# Patient Record
Sex: FEMALE | Race: WHITE | NOT HISPANIC OR LATINO | Employment: FULL TIME | URBAN - METROPOLITAN AREA
[De-identification: names, ages, dates, MRNs, and addresses within clinical notes are randomized per-mention and may not be internally consistent; named-entity substitution may affect disease eponyms.]

---

## 2023-01-18 ENCOUNTER — OFFICE VISIT (OUTPATIENT)
Dept: ORTHOPEDIC SURGERY | Facility: CLINIC | Age: 58
End: 2023-01-18
Payer: COMMERCIAL

## 2023-01-18 VITALS — WEIGHT: 207 LBS | HEART RATE: 84 BPM | HEIGHT: 63 IN | OXYGEN SATURATION: 98 % | BODY MASS INDEX: 36.68 KG/M2

## 2023-01-18 DIAGNOSIS — M25.521 RIGHT ELBOW PAIN: Primary | ICD-10-CM

## 2023-01-18 DIAGNOSIS — M77.11 RIGHT TENNIS ELBOW: ICD-10-CM

## 2023-01-18 PROCEDURE — 99203 OFFICE O/P NEW LOW 30 MIN: CPT | Performed by: ORTHOPAEDIC SURGERY

## 2023-01-18 NOTE — PROGRESS NOTES
"Chief Complaint  Initial Evaluation of the Right Elbow     Subjective      Mary Jo Mendoza presents to [facility] for initial evaluation of the right elbow. She has pain from her lateral aspect of her hand to her elbow.  She has a repetitive job lifting car parts.  She notes this has been going on for 4-5 weeks.  She has tried a tennis elbow brace.  No other injury noted.     No Known Allergies     Social History     Socioeconomic History   • Marital status:    Tobacco Use   • Smoking status: Every Day     Packs/day: 1.00     Types: Cigarettes   • Smokeless tobacco: Never   Vaping Use   • Vaping Use: Never used   Substance and Sexual Activity   • Alcohol use: Not Currently        Review of Systems     Objective   Vital Signs:   Pulse 84   Ht 160 cm (63\")   Wt 93.9 kg (207 lb)   SpO2 98%   BMI 36.67 kg/m²       Physical Exam  Constitutional:       Appearance: Normal appearance. Patient is well-developed and normal weight.   HENT:      Head: Normocephalic.      Right Ear: Hearing and external ear normal.      Left Ear: Hearing and external ear normal.      Nose: Nose normal.   Eyes:      Conjunctiva/sclera: Conjunctivae normal.   Cardiovascular:      Rate and Rhythm: Normal rate.   Pulmonary:      Effort: Pulmonary effort is normal.      Breath sounds: No wheezing or rales.   Abdominal:      Palpations: Abdomen is soft.      Tenderness: There is no abdominal tenderness.   Musculoskeletal:      Cervical back: Normal range of motion.   Skin:     Findings: No rash.   Neurological:      Mental Status: Patient  is alert and oriented to person, place, and time.   Psychiatric:         Mood and Affect: Mood and affect normal.         Judgment: Judgment normal.       Ortho Exam      RIGHT ELBOW Tender lateral epicondyle. Tender medial epicondyle. Tender radial head. Sensation to light touch median, radial, ulnar nerve. Positive AIN, PIN, ulnar nerve motor function. Axillary sensation " intact. Elbow ROM 0-160. Negative Tinel's at the elbow. Pain with resisted wrist and long finger extension.       Procedures        Imaging Results (Most Recent)     Procedure Component Value Units Date/Time    XR Elbow 2 View Right [[id number]] Resulted: 01/18/23 0750     Updated: 01/18/23 0751           Result Review :     X-Ray Report:  Right elbow X-Ray  Indication: Evaluation of of the right elbow  AP/Lateral view(s)  Findings: No dislocation or fracture.   Prior studies available for comparison: No             Assessment and Plan     Diagnoses and all orders for this visit:    1. Right elbow pain (Primary)  -     XR Elbow 2 View Right    2. Right tennis elbow        Discussed the treatment plan with the patient. Discussed the risks and benefits of stretching, anti inflammatories, and injections.  The patient expressed understanding and wished to proceed with stretches and physical therapy.  She will call and schedule if it doesn't get better to discuss a steroid injection of the elbow.     Educated on risk of smoking. Discussed options for smoking cessation. and Call or return if worsening symptoms.    Follow Up     PRN      Patient was given instructions and counseling regarding her condition or for health maintenance advice. Please see specific information pulled into the AVS if appropriate.     Scribed for Edgar Mulligan MD by Marva Navarro MA.  01/18/23   07:54 EST    I have personally performed the services described in this document as scribed by the above individual and it is both accurate and complete. Edgar Mulligan MD 01/18/23

## 2023-01-19 ENCOUNTER — TREATMENT (OUTPATIENT)
Dept: PHYSICAL THERAPY | Facility: CLINIC | Age: 58
End: 2023-01-19
Payer: COMMERCIAL

## 2023-01-19 DIAGNOSIS — M25.521 RIGHT ELBOW PAIN: ICD-10-CM

## 2023-01-19 DIAGNOSIS — M77.11 LATERAL EPICONDYLITIS OF RIGHT ELBOW: Primary | ICD-10-CM

## 2023-01-19 PROCEDURE — 97166 OT EVAL MOD COMPLEX 45 MIN: CPT | Performed by: OCCUPATIONAL THERAPIST

## 2023-01-19 PROCEDURE — 97140 MANUAL THERAPY 1/> REGIONS: CPT | Performed by: OCCUPATIONAL THERAPIST

## 2023-01-19 PROCEDURE — 97110 THERAPEUTIC EXERCISES: CPT | Performed by: OCCUPATIONAL THERAPIST

## 2023-01-19 NOTE — PROGRESS NOTES
Outpatient Occupational Therapy Ortho Initial Evaluation  [address] [place] [id number]    Patient: Mary Jo Mendoza   : 1965  Diagnosis/ICD-10 Code:  Lateral epicondylitis of right elbow [M77.11]  Referring practitioner: Edgar Mulligan MD  Date of Initial Visit: 2023  Today's Date: 2023  Patient seen for 1 sessions               Subjective Questionnaire: QuickDASH: 25      Subjective Evaluation    History of Present Illness  Date of onset: 12/15/2022  Mechanism of injury: About 5 weeks ago I began to have R elbow pain, progressively worsening pain.  Loads parts and delivers to dealerships.  Tried brace during the day and it made it worse.       Patient Occupation: loads and delivers parts for [employer]   Precautions and Work Restrictions: lift odd sizes and heavy parts up to #100Quality of life: excellent    Pain  Current pain ratin  At worst pain rating: 10  Location: Lateral epicondyle radiates to SF  Quality: radiating  Relieving factors: medications (ibuprofen )  Aggravating factors: repetitive movement and lifting  Progression: improved    Social Support  Lives in: multiple-level home  Lives with: spouse    Hand dominance: right    Diagnostic Tests  X-ray: normal    Treatments  Previous treatment: immobilization and medication  Patient Goals  Patient goals for therapy: decreased pain, increased strength and return to work           Past Medical hx: R wrist ulnar sided 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     Right Wrist/Hand   Tenderness in the lateral epicondyle.     Additional Tenderness Details  Positive 3rd digit extension     Neurological Testing     Sensation     Elbow     Comments   Right light touch: denies numbness and tingling; radiating radial nerve path.     Active Range of Motion     Additional Active Range of Motion Details  Elbow flexed  Wrist ext  60          Wrist flex   50  Elbow extended  Wrist ext  50          Wrist flex 40      Strength/Myotome Testing     Additional Strength Details  Elbow flexed   L #70   R #15  Elbow Ext       L #60   R #10          Assessment & Plan     Assessment  Impairments: abnormal coordination, abnormal muscle firing, abnormal or restricted ROM, activity intolerance, impaired physical strength, lacks appropriate home exercise program, pain with function, safety issue and weight-bearing intolerance  Functional Limitations: carrying objects, lifting, pulling, pushing, reaching behind back, reaching overhead and unable to perform repetitive tasks  Assessment details: Pt presents with R elbow pain pinpoint at lateral epicondyle radiating towards SF along radial nerve path.  Pt has difficulty with lifting, gripping, using R UE for functional tasks. Pt having difficulty lifting coffe cup with R hand.  Pt has elbow pain with all functional movements.   Prognosis: good    Goals  Plan Goals: 1. The patient complains of pain in the R elbow                  LTG 1: 12 weeks:  The patient will report a pain rating of 0/10 or better in order to improve sleep quality and tolerance to performance of activities of daily living.                                  STATUS:  New                  STG 1a: 4weeks:  The patient will report a pain rating of 5/10 or better.                                   STATUS:  New  2. The patient has limited ROM of the R elbow                   LTG 2: 12 weeks:  The patient will demonstrate 60 degrees of R wrist ext and flex with elbow extended to allow the patient to manipulate parts.                                  STATUS:  New                   STG 2a: 4 weeks:  The patient will demonstrate 50 degrees of R wrist ext and flex.                                  STATUS:  New                             3. The patient has limited strength of the R UE.                  LTG 3: 12 weeks:  The patient will demonstrate 60# in order to return to  lifting at work.                                  STATUS:  New                  STG 3a: 4 weeks:  The patient will demonstrate tolerance to light strengthening without adverse reaction.                                  STATUS:  New  4. Carrying, Moving, and Handling Objects Functional Limitation                                   LTG 4: 12 weeks:  The patient will demonstrate 0% limitation by achieving a score of 11 on the Quick DASH.                                  STATUS:  New                  STG 4a: 4 weeks:  The patient will demonstrate 1-19% limitation by achieving a score of 15 on the Quick DASH.                                    STATUS:  New                    TREATMENT: Orthotic fabrication/fitting/management and training, Manual therapy, therapeutic exercise, home exercise instruction, and modalities as needed to include: electrical stimulation, ultrasound, moist heat, paraffin, fluidotherapy, dry needling, and ice.    Plan  Planned modality interventions: TENS, thermotherapy (hydrocollator packs), thermotherapy (paraffin bath), ultrasound and electrical stimulation/Russian stimulation  Other planned modality interventions: fluidotherapy, dry needling  Planned therapy interventions: manual therapy, ADL retraining, motor coordination training, neuromuscular re-education, soft tissue mobilization, fine motor coordination training, body mechanics training, balance/weight-bearing training, functional ROM exercises, flexibility, spinal/joint mobilization, strengthening, stretching, therapeutic activities, IADL retraining, joint mobilization and home exercise program  Frequency: 2x week  Duration in weeks: 12  Treatment plan discussed with: patient        Patient is indicated for skilled occupational therapy services.    History # of Personal Factors and/or Comorbidities: MODERATE (1-2)  Examination of Body System(s): # of elements: MODERATE (3)  Clinical Presentation: EVOLVING  Clinical Decision Making:  MODERATE     Evaluation:  Low Complexity:    0     mins  32510;  Mod Complexity:    20     mins  15126;  High Complexity:    0     mins  55573;    Timed:  Manual Therapy:    15     mins  89199;  Therapeutic Exercise:    10     mins  17687;  Therapeutic Activity:    0     mins  54858;     Neuromuscular Red:    0    mins  03227;    Ultrasound:     0     mins  39869;    Electrical Stimulation:    0     mins  61326;    Untimed:  Electrical Stimulation:    0     mins  42314 ( );  Fluidotherapy:        0    mins  86171  Paraffin:                          0    mins  03971    Timed Treatment:   25   mins   Total Treatment:     45   mins      OT SIGNATURE: MELANIE Carpenter, OTR/L, CHT     Electronically signed    [place] LICENSE: [id number]   DATE TREATMENT INITIATED: 1/19/2023    Initial Certification  Certification Period: 1/19/2023 thru 4/18/2023  I certify that the therapy services are furnished while this patient is under my care.  The services outlined above are required by this patient, and will be reviewed every 90 days.     Signature:______________________________________________ PHYSICIAN:  Edgar Mulligan MD   NPI: [id number]                                      DATE:    Please sign and return via fax to [phone]   Thank you, [facility].

## 2023-01-25 ENCOUNTER — TREATMENT (OUTPATIENT)
Dept: PHYSICAL THERAPY | Facility: CLINIC | Age: 58
End: 2023-01-25
Payer: COMMERCIAL

## 2023-01-25 DIAGNOSIS — M25.521 RIGHT ELBOW PAIN: ICD-10-CM

## 2023-01-25 DIAGNOSIS — M77.11 LATERAL EPICONDYLITIS OF RIGHT ELBOW: Primary | ICD-10-CM

## 2023-01-25 PROCEDURE — 97112 NEUROMUSCULAR REEDUCATION: CPT | Performed by: OCCUPATIONAL THERAPIST

## 2023-01-25 PROCEDURE — 97110 THERAPEUTIC EXERCISES: CPT | Performed by: OCCUPATIONAL THERAPIST

## 2023-01-25 NOTE — PROGRESS NOTES
Occupational Therapy Daily Treatment Note      Patient: Mary Jo Mendoza   : 1965  Referring practitioner: Edgar Mulligan MD  Date of Initial Visit: Type: THERAPY  Noted: 2023  Today's Date: 2023  Patient seen for 2 sessions    ICD-10-CM ICD-9-CM   1. Lateral epicondylitis of right elbow  M77.11 726.32   2. Right elbow pain  M25.521 719.42          Mary Jo Mendoza reports I was sore after our last visit and hurting today.  7/10 pain But I've been working this morning.        Objective   See Exercise, Manual, and Modality Logs for complete treatment.   Pt is having shooting pains and point tender in radial tunnel and lateral epicondyle.    Assessment/Plan  Kinesiotape applied with fair tolerance, added scapular squeezes and radial nerve glides to HEP      Cont per POC           Timed:  Manual Therapy:    12     mins  16143;  Therapeutic Exercise:    10     mins  33020;  Therapeutic Activity:    0     mins  03999;     Neuromuscular Red:    8    mins  14288;    Ultrasound:     0     mins  94148;    Electrical Stimulation:    0     mins  28174;    Untimed:  Electrical Stimulation:    0     mins  15267 ( );  Fluidotherapy:        0    mins  58389  Paraffin:                          0    mins  50241    Timed Treatment:   30   mins   Total Treatment:     30   mins    OT SIGNATURE: MELANIE Carpenter, OTR/L, CHT     Electronically signed    [place] LICENSE: [id number]

## 2023-01-27 ENCOUNTER — TREATMENT (OUTPATIENT)
Dept: PHYSICAL THERAPY | Facility: CLINIC | Age: 58
End: 2023-01-27
Payer: COMMERCIAL

## 2023-01-27 DIAGNOSIS — M25.521 RIGHT ELBOW PAIN: ICD-10-CM

## 2023-01-27 DIAGNOSIS — M77.11 LATERAL EPICONDYLITIS OF RIGHT ELBOW: Primary | ICD-10-CM

## 2023-01-27 PROCEDURE — 97110 THERAPEUTIC EXERCISES: CPT | Performed by: OCCUPATIONAL THERAPIST

## 2023-01-27 PROCEDURE — 97112 NEUROMUSCULAR REEDUCATION: CPT | Performed by: OCCUPATIONAL THERAPIST

## 2023-01-27 NOTE — PROGRESS NOTES
Occupational Therapy Daily Treatment Note      Patient: Mary Jo Mendoza   : 1965  Referring practitioner: Edgar Mulligan MD  Date of Initial Visit: Type: THERAPY  Noted: 2023  Today's Date: 2023  Patient seen for 3 sessions    ICD-10-CM ICD-9-CM   1. Lateral epicondylitis of right elbow  M77.11 726.32   2. Right elbow pain  M25.521 719.42          Mary Jo Mendoza reports About 5-6/10 pain today.  It wasn't too sore after last time.  Yesterday I had sharp pain pin point lateral pain (at LE)      Objective   See Exercise, Manual, and Modality Logs for complete treatment.   Kinesiotape removed.  ASTYM and cupping performed with fair tolerance.    Assessment/Plan    Added radial nerve glides to R UE this date for HEP.    Cont per POC           Timed:  Manual Therapy:    12     mins  75214;  Therapeutic Exercise:    10     mins  04318;  Therapeutic Activity:    0     mins  76870;     Neuromuscular Red:    8    mins  66214;    Ultrasound:     0     mins  69516;    Electrical Stimulation:    0     mins  26954;    Untimed:  Electrical Stimulation:    0     mins  78036 ( );  Fluidotherapy:        0    mins  28831  Paraffin:                          0    mins  66091    Timed Treatment:   30   mins   Total Treatment:     30   mins    OT SIGNATURE: MELANIE Carpenter, OTR/L, CHT     Electronically signed    [place] LICENSE: [id number]

## 2023-01-31 ENCOUNTER — TREATMENT (OUTPATIENT)
Dept: PHYSICAL THERAPY | Facility: CLINIC | Age: 58
End: 2023-01-31
Payer: COMMERCIAL

## 2023-01-31 DIAGNOSIS — M25.521 RIGHT ELBOW PAIN: ICD-10-CM

## 2023-01-31 DIAGNOSIS — M77.11 LATERAL EPICONDYLITIS OF RIGHT ELBOW: Primary | ICD-10-CM

## 2023-01-31 PROCEDURE — 97112 NEUROMUSCULAR REEDUCATION: CPT | Performed by: OCCUPATIONAL THERAPIST

## 2023-01-31 PROCEDURE — 97110 THERAPEUTIC EXERCISES: CPT | Performed by: OCCUPATIONAL THERAPIST

## 2023-01-31 PROCEDURE — DRYNDLTRIAL DRY NEEDLING TRIAL: Performed by: OCCUPATIONAL THERAPIST

## 2023-01-31 NOTE — PROGRESS NOTES
Occupational Therapy Daily Treatment Note      Patient: Mary Jo Mendoza   : 1965  Referring practitioner: Edgar Mulligan MD  Date of Initial Visit: Type: THERAPY  Noted: 2023  Today's Date: 2023  Patient seen for 4 sessions    ICD-10-CM ICD-9-CM   1. Lateral epicondylitis of right elbow  M77.11 726.32   2. Right elbow pain  M25.521 719.42          Mary Jo Mendoza reports I am sore and it is hurting 3-4/10        Objective   See Exercise, Manual, and Modality Logs for complete treatment.     The patient was educated on the risks/benefits associated with dry needling and afterwards provided both verbal and written consent to treatment. Written consent on file at the facility.    Before treatment  Pain 4/10  ROM WFL    After treatment  Pain just an ache at the elbow  ROM WFL    4 needles placed at A and P orientation towards bony back drop.  Needle manipulation performed included piston at R Lateral epicondyle  Patient had no adverse reaction to removal of needle.     The patient tolerated dry needling well today with no immediate adverse effects.  Continue to monitor for long term benefit at next session    Assessment/Plan        Cont per POC           Timed:  Manual Therapy:    8     mins  13031;  Therapeutic Exercise:    9     mins  75475;  Therapeutic Activity:    0     mins  90101;     Neuromuscular Red:    8    mins  77403;    Ultrasound:     0     mins  35701;    Electrical Stimulation:    0     mins  74479;      Untimed:  Electrical Stimulation:    0     mins  63922 ( );  Fluidotherapy:        0    mins  51246  Paraffin:                          0    mins  64713  Dry Needle Trial:             5    mins  DRYNDLTRIAL    Timed Treatment:   25   mins   Total Treatment:     30   mins    OT SIGNATURE: MELANIE Carpenter, OTR/L, CHT     Electronically signed    [place] LICENSE: [id number]

## 2023-02-02 ENCOUNTER — TREATMENT (OUTPATIENT)
Dept: PHYSICAL THERAPY | Facility: CLINIC | Age: 58
End: 2023-02-02
Payer: COMMERCIAL

## 2023-02-02 DIAGNOSIS — M77.11 LATERAL EPICONDYLITIS OF RIGHT ELBOW: Primary | ICD-10-CM

## 2023-02-02 DIAGNOSIS — M25.521 RIGHT ELBOW PAIN: ICD-10-CM

## 2023-02-02 PROCEDURE — 97110 THERAPEUTIC EXERCISES: CPT | Performed by: OCCUPATIONAL THERAPIST

## 2023-02-02 PROCEDURE — 97112 NEUROMUSCULAR REEDUCATION: CPT | Performed by: OCCUPATIONAL THERAPIST

## 2023-02-02 NOTE — PROGRESS NOTES
Occupational Therapy Daily Treatment Note      Patient: Mary Jo Mendoza   : 1965  Referring practitioner: Edgar Mulligan MD  Date of Initial Visit: Type: THERAPY  Noted: 2023  Today's Date: 2023  Patient seen for 5 sessions    ICD-10-CM ICD-9-CM   1. Lateral epicondylitis of right elbow  M77.11 726.32   2. Right elbow pain  M25.521 719.42          Mary Jo Mendoza reports the pain is less sharp today, I feel lary bruised but better. 3/10 pain      Subjective     Objective   See Exercise, Manual, and Modality Logs for complete treatment.   STM had decreased tenderness at lateral epicondyle.  Muscle belly of extensor wad is less tight this date.    Assessment/Plan  Progressing with ROM and tolerance for functional tasks.      Cont per POC           Timed:  Manual Therapy:    8     mins  14867;  Therapeutic Exercise:    12     mins  89403;  Therapeutic Activity:    0     mins  62304;     Neuromuscular Red:    10    mins  46610;    Ultrasound:     0     mins  40302;    Electrical Stimulation:    0     mins  10771;    Untimed:  Electrical Stimulation:    0     mins  68771 ( );  Fluidotherapy:        0    mins  07767  Paraffin:                          0    mins  16536    Timed Treatment:   30   mins   Total Treatment:     30   mins    OT SIGNATURE: MELANIE Carpenter, OTR/L, CHT     Electronically signed    [place] LICENSE: [id number]

## 2023-02-10 ENCOUNTER — TREATMENT (OUTPATIENT)
Dept: PHYSICAL THERAPY | Facility: CLINIC | Age: 58
End: 2023-02-10
Payer: COMMERCIAL

## 2023-02-10 DIAGNOSIS — M25.521 RIGHT ELBOW PAIN: ICD-10-CM

## 2023-02-10 DIAGNOSIS — M77.11 LATERAL EPICONDYLITIS OF RIGHT ELBOW: Primary | ICD-10-CM

## 2023-02-10 PROCEDURE — 97140 MANUAL THERAPY 1/> REGIONS: CPT | Performed by: OCCUPATIONAL THERAPIST

## 2023-02-10 PROCEDURE — 97110 THERAPEUTIC EXERCISES: CPT | Performed by: OCCUPATIONAL THERAPIST

## 2023-02-10 PROCEDURE — 20561 NDL INSJ W/O NJX 3+ MUSC: CPT | Performed by: OCCUPATIONAL THERAPIST

## 2023-02-10 NOTE — PROGRESS NOTES
Occupational Therapy Daily Treatment Note      Patient: Mary Jo Mendoza   : 1965  Referring practitioner: Edgar Mulligan MD  Date of Initial Visit: Type: THERAPY  Noted: 2023  Today's Date: 2/10/2023  Patient seen for 6 sessions    ICD-10-CM ICD-9-CM   1. Lateral epicondylitis of right elbow  M77.11 726.32   2. Right elbow pain  M25.521 719.42          Mary Jo Mendoza reports still having some pain, not the excruciating sharp pain I was having.        Objective   See Exercise, Manual, and Modality Logs for complete treatment.   The patient was educated on the risks/benefits associated with dry needling and afterwards provided both verbal and written consent to treatment. Written consent on file at the facility.    Before treatment  Pain 2/10  ROM wrist ext elbow flex 60             Wrist ext elbow ext 60    After treatment  Pain 0/10  ROM wrist ext elbow flex 65            Wrist ext elbow ext 65      5 needles placed at A and P orientation towards bony back drop.  Needle manipulation performed included piston at LE, insertion in each of the other positions  Patient had no adverse reaction to removal of needle.     The patient tolerated dry needling well today with no immediate adverse effects.  Continue to monitor for long term benefit at next session    Assessment/Plan  Pt is having decreased sharp pain.  Pt is progressing with ROM and ability to lift without increased shooting pain in R elbow      Cont per POC           Timed:  Manual Therapy:    10     mins  19174;  Therapeutic Exercise:    15     mins  70730;  Therapeutic Activity:    0     mins  96197;     Neuromuscular Red:   0    mins  63455;    Ultrasound:     0     mins  03102;    Electrical Stimulation:    0     mins  92633;    Untimed:  Electrical Stimulation:    0     mins  34541 ( );  Fluidotherapy:        0    mins  18635  Dry Needling 3+:            6    mins      Timed Treatment:   25   mins   Total Treatment:     31    mins    OT SIGNATURE: Mindy Rhoades, MELANIE, OTR/L, CHT     Electronically signed    [place] LICENSE: [id number]

## 2023-02-13 ENCOUNTER — TREATMENT (OUTPATIENT)
Dept: PHYSICAL THERAPY | Facility: CLINIC | Age: 58
End: 2023-02-13
Payer: COMMERCIAL

## 2023-02-13 DIAGNOSIS — M25.521 RIGHT ELBOW PAIN: ICD-10-CM

## 2023-02-13 DIAGNOSIS — M77.11 LATERAL EPICONDYLITIS OF RIGHT ELBOW: Primary | ICD-10-CM

## 2023-02-13 PROCEDURE — 97110 THERAPEUTIC EXERCISES: CPT | Performed by: OCCUPATIONAL THERAPIST

## 2023-02-13 PROCEDURE — 97530 THERAPEUTIC ACTIVITIES: CPT | Performed by: OCCUPATIONAL THERAPIST

## 2023-02-13 NOTE — PROGRESS NOTES
Occupational Therapy Daily Treatment Note      Patient: Mary Jo Mendoza   : 1965  Referring practitioner: Edgar Mulligan MD  Date of Initial Visit: Type: THERAPY  Noted: 2023  Today's Date: 2023  Patient seen for 7 sessions    ICD-10-CM ICD-9-CM   1. Lateral epicondylitis of right elbow  M77.11 726.32   2. Right elbow pain  M25.521 719.42          Mary Jo Mendoza reports I am feeling good, a few stabbing pains. Overall feeling better.  Been working on my deck this weekend.  It didn't seem to bother my arm.       Objective   See Exercise, Manual, and Modality Logs for complete treatment.   Added increased resistance this date with increased tolerance for functional gripping and lifting without increased pain this date.     Assessment/Plan    Overall tolerated STM better with less soreness this date, noted that EDC still had small know at radial tunnel.     Cont per POC           Timed:  Manual Therapy:    10     mins  55885;  Therapeutic Exercise:    10     mins  84960;  Therapeutic Activity:    10     mins  66105;     Neuromuscular Red:    0    mins  22813;    Ultrasound:     0     mins  54820;    Electrical Stimulation:    0     mins  70095;    Untimed:  Electrical Stimulation:    0     mins  35136 ( );  Fluidotherapy:        0    mins  04396  Paraffin:                          0    mins  16664    Timed Treatment:   30   mins   Total Treatment:     30   mins    OT SIGNATURE: MELANIE Carpenter, OTR/L, CHT     Electronically signed    [place] LICENSE: [id number]

## 2023-02-17 ENCOUNTER — TREATMENT (OUTPATIENT)
Dept: PHYSICAL THERAPY | Facility: CLINIC | Age: 58
End: 2023-02-17
Payer: COMMERCIAL

## 2023-02-17 DIAGNOSIS — M25.521 RIGHT ELBOW PAIN: ICD-10-CM

## 2023-02-17 DIAGNOSIS — M77.11 LATERAL EPICONDYLITIS OF RIGHT ELBOW: Primary | ICD-10-CM

## 2023-02-17 PROCEDURE — 97110 THERAPEUTIC EXERCISES: CPT | Performed by: OCCUPATIONAL THERAPIST

## 2023-02-17 PROCEDURE — 97112 NEUROMUSCULAR REEDUCATION: CPT | Performed by: OCCUPATIONAL THERAPIST

## 2023-02-17 NOTE — PROGRESS NOTES
Re-Assessment / Re-Certification  Occupational Therapy  [address] [place] [id number]      Patient: Mary Jo Mendoza   : 1965  Diagnosis/ICD-10 Code:  Lateral epicondylitis of right elbow [M77.11]  Referring practitioner: Edgar Mulligan MD  Date of Initial Visit: Type: THERAPY  Noted: 2023  Today's Date: 2023  Patient seen for 8 sessions      Subjective:   Mary Jo Mendoza reports: I am having some stabbing pain 2-3/10  Subjective Questionnaire: QuickDASH: 16  Clinical Progress: improved  Home Program Compliance: Yes  Treatment has included: therapeutic exercise, neuromuscular re-education, manual therapy, therapeutic activity and dry needling    Subjective   Objective   Elbow flexed  Wrist ext  65          Wrist flex  55  Elbow extended  Wrist ext  65          Wrist flex 40      Strength/Myotome Testing     Additional Strength Details  Elbow flexed   L #70   R #35  Elbow Ext       L #60   R #1        Assessment/Plan    Visit Diagnoses:    ICD-10-CM ICD-9-CM   1. Lateral epicondylitis of right elbow  M77.11 726.32   2. Right elbow pain  M25.521 719.42       Progress toward previous goals: Partially Met    Plan Goals: 1. The patient complains of pain in the R elbow                  LTG 1: 12 weeks:  The patient will report a pain rating of 0/10 or better in order to improve sleep quality and tolerance to performance of activities of daily living.                                  STATUS:  NOT MET                  STG 1a: 4weeks:  The patient will report a pain rating of 5/10 or better.                                   STATUS:  MET  2. The patient has limited ROM of the R elbow                   LTG 2: 12 weeks:  The patient will demonstrate 60 degrees of R wrist ext and flex with elbow extended to allow the patient to manipulate parts.                                  STATUS:  NOT MET                   STG 2a: 4 weeks:  The patient will demonstrate 50 degrees of R wrist ext and  flex.                                  STATUS: MET                            3. The patient has limited strength of the R UE.                  LTG 3: 12 weeks:  The patient will demonstrate 60# in order to return to lifting at work.                                  STATUS:  NOT MET                  STG 3a: 4 weeks:  The patient will demonstrate tolerance to light strengthening without adverse reaction.                                  STATUS:  MET  4. Carrying, Moving, and Handling Objects Functional Limitation                                   LTG 4: 12 weeks:  The patient will demonstrate 0% limitation by achieving a score of 11 on the Quick DASH.                                  STATUS:  NOT MET                  STG 4a: 4 weeks:  The patient will demonstrate 1-19% limitation by achieving a score of 15 on the Quick DASH.                                    STATUS:  NOT MET      Recommendations: Continue as planned  Timeframe: 1 month  Prognosis to achieve goals: good      OT SIGNATURE: Mindy Rhoades OTD, OTR/L, CHT     Electronically signed      Timed:  Manual Therapy:    10     mins  26241;  Therapeutic Exercise:    10     mins  47239;  Therapeutic Activity:    0     mins  14526;     Neuromuscular Red:    10    mins  58987;    Ultrasound:     0     mins  08801;    Electrical Stimulation:    0     mins  89022;    Untimed:  Electrical Stimulation:    0     mins  12099 ( );  Fluidotherapy:        0    mins  09120  Paraffin:                          `0    mins  75060    Timed Treatment:   30   mins   Total Treatment:     30   mins

## 2023-02-21 ENCOUNTER — TREATMENT (OUTPATIENT)
Dept: PHYSICAL THERAPY | Facility: CLINIC | Age: 58
End: 2023-02-21
Payer: COMMERCIAL

## 2023-02-21 DIAGNOSIS — M25.521 RIGHT ELBOW PAIN: ICD-10-CM

## 2023-02-21 DIAGNOSIS — M77.11 LATERAL EPICONDYLITIS OF RIGHT ELBOW: Primary | ICD-10-CM

## 2023-02-21 PROCEDURE — 20561 NDL INSJ W/O NJX 3+ MUSC: CPT | Performed by: OCCUPATIONAL THERAPIST

## 2023-02-21 PROCEDURE — 97110 THERAPEUTIC EXERCISES: CPT | Performed by: OCCUPATIONAL THERAPIST

## 2023-02-21 PROCEDURE — 97140 MANUAL THERAPY 1/> REGIONS: CPT | Performed by: OCCUPATIONAL THERAPIST

## 2023-02-21 NOTE — PROGRESS NOTES
"Occupational Therapy Daily Treatment Note      Patient: Mary Jo Mendoza   : 1965  Referring practitioner: Edgar Mulligan MD  Date of Initial Visit: Type: THERAPY  Noted: 2023  Today's Date: 2023  Patient seen for 9 sessions    ICD-10-CM ICD-9-CM   1. Lateral epicondylitis of right elbow  M77.11 726.32   2. Right elbow pain  M25.521 719.42          Mary Jo Mendoza reports yesterday it hurt quite a bit during the day.  Stabbing pain 4-5/10 pain yesterday. 2/10 pain today.      Objective   See Exercise, Manual, and Modality Logs for complete treatment.   EDC very tight this date, noted to have some trigger points and\"knot\" with STM.     The patient was educated on the risks/benefits associated with dry needling and afterwards provided both verbal and written consent to treatment. Written consent on file at the facility.    Before treatment  Pain 2/10  ROM wrist ext elbow flex 65            Wrist ext elbow ext 65    After treatment  Pain 1/10  ROM wrist ext elbow flex 65            Wrist ext elbow ext 65  Estim parameters 2 Hz  100 width  7 needles placed at anterior posterior orientation towards bony back drop.  Needle manipulation performed included insertion and pistoning  Patient had no adverse reaction to removal of needle.     The patient tolerated dry needling well today with no immediate adverse effects.  Continue to monitor for long term benefit at next session    Assessment/Plan  Pt had relief with Dry needling and TENS unit.  Pt reports pain improved following treatment.       Cont per POC           Timed:  Manual Therapy:    10     mins  61075;  Therapeutic Exercise:    15     mins  97691;  Therapeutic Activity:    0     mins  91669;     Neuromuscular Red:    0    mins  86903;    Ultrasound:     0     mins  68033;    Electrical Stimulation:    0     mins  11224;    Untimed:  Electrical Stimulation:    0     mins  36047 ( );  Fluidotherapy:        0    mins  34491  Dry Needling:        "           10    mins 04863    Timed Treatment:   25   mins   Total Treatment:     35   mins    OT SIGNATURE: MELANIE Carpenter, OTR/L, CHT     Electronically signed    [place] LICENSE: [id number]

## 2023-02-23 ENCOUNTER — TREATMENT (OUTPATIENT)
Dept: PHYSICAL THERAPY | Facility: CLINIC | Age: 58
End: 2023-02-23
Payer: COMMERCIAL

## 2023-02-23 DIAGNOSIS — M25.521 RIGHT ELBOW PAIN: ICD-10-CM

## 2023-02-23 DIAGNOSIS — M77.11 LATERAL EPICONDYLITIS OF RIGHT ELBOW: Primary | ICD-10-CM

## 2023-02-23 PROCEDURE — 97110 THERAPEUTIC EXERCISES: CPT | Performed by: OCCUPATIONAL THERAPIST

## 2023-02-23 PROCEDURE — 97530 THERAPEUTIC ACTIVITIES: CPT | Performed by: OCCUPATIONAL THERAPIST

## 2023-02-23 NOTE — PROGRESS NOTES
"Occupational Therapy Daily Treatment Note      Patient: Mary Jo Mendoza   : 1965  Referring practitioner: Edgar Mulligan MD  Date of Initial Visit: Type: THERAPY  Noted: 2023  Today's Date: 2023  Patient seen for 10 sessions    ICD-10-CM ICD-9-CM   1. Lateral epicondylitis of right elbow  M77.11 726.32   2. Right elbow pain  M25.521 719.42          Mary Jo Mendoza reports It bothered me yesterday and today about 3/10 pain.       Objective   See Exercise, Manual, and Modality Logs for complete treatment.   Pt educated to use scoop method with a supinated forearm and elbows close to body to lift large items.    Kinesiotape applied over LE in ''X\"  To support LE insertion    Assessment/Plan  Pt has increased pain at LE overall, but extensor muscle belly is not as sore.  Cont strengthening, elbow support and modifying lifting.       Cont per POC           Timed:  Manual Therapy:    10     mins  83333;  Therapeutic Exercise:    10     mins  36660;  Therapeutic Activity:    10     mins  46270;     Neuromuscular Red:    0    mins  80570;    Ultrasound:     0     mins  04081;    Electrical Stimulation:    0     mins  89344;    Untimed:  Electrical Stimulation:    0     mins  72963 ( );  Fluidotherapy:        0    mins  69171  Paraffin:                          0    mins  90650    Timed Treatment:   30   mins   Total Treatment:     30   mins    OT SIGNATURE: MELANIE Carpenter, OTR/L, CHT     Electronically signed    [place] LICENSE: [id number]     "

## 2023-03-02 ENCOUNTER — TREATMENT (OUTPATIENT)
Dept: PHYSICAL THERAPY | Facility: CLINIC | Age: 58
End: 2023-03-02
Payer: COMMERCIAL

## 2023-03-02 DIAGNOSIS — M25.521 RIGHT ELBOW PAIN: ICD-10-CM

## 2023-03-02 DIAGNOSIS — M77.11 LATERAL EPICONDYLITIS OF RIGHT ELBOW: Primary | ICD-10-CM

## 2023-03-02 PROCEDURE — G0283 ELEC STIM OTHER THAN WOUND: HCPCS | Performed by: OCCUPATIONAL THERAPIST

## 2023-03-02 PROCEDURE — 97112 NEUROMUSCULAR REEDUCATION: CPT | Performed by: OCCUPATIONAL THERAPIST

## 2023-03-02 PROCEDURE — 97110 THERAPEUTIC EXERCISES: CPT | Performed by: OCCUPATIONAL THERAPIST

## 2023-03-02 NOTE — PROGRESS NOTES
Occupational Therapy Daily Treatment Note      Patient: Mary Jo Mendoza   : 1965  Referring practitioner: Edgar Mulligan MD  Date of Initial Visit: Type: THERAPY  Noted: 2023  Today's Date: 3/2/2023  Patient seen for 11 sessions    ICD-10-CM ICD-9-CM   1. Lateral epicondylitis of right elbow  M77.11 726.32   2. Right elbow pain  M25.521 719.42          Mary Jo Mendoza reports It comes and goes with shooting pains.  This morning I feel pretty good.  2/10     Objective   See Exercise, Manual, and Modality Logs for complete treatment.   Point tenderness at LE is signficantly improved this date.   Updated radial nerve glide this date to isolate at the elbow and increase neural mobility.    Assessment/Plan  Added TENS unit to treatment with fair tolerance.       Cont per POC           Timed:  Manual Therapy:    10     mins  45691;  Therapeutic Exercise:    10     mins  93962;  Therapeutic Activity:    0     mins  98230;     Neuromuscular Red:    10    mins  70624;    Ultrasound:     0     mins  16443;    Electrical Stimulation:    0     mins  10709;    Untimed:  Electrical Stimulation:    10     mins  94894 ( ) concurrent with treatment;  Fluidotherapy:        0    mins  25518  Paraffin:                          0    mins  14197    Timed Treatment:   30   mins   Total Treatment:     40   mins    OT SIGNATURE: MELANIE Carpenter, OTR/L, CHT     Electronically signed    [place] LICENSE: [id number]

## 2023-03-07 ENCOUNTER — TREATMENT (OUTPATIENT)
Dept: PHYSICAL THERAPY | Facility: CLINIC | Age: 58
End: 2023-03-07
Payer: COMMERCIAL

## 2023-03-07 DIAGNOSIS — M25.521 RIGHT ELBOW PAIN: ICD-10-CM

## 2023-03-07 DIAGNOSIS — M77.11 LATERAL EPICONDYLITIS OF RIGHT ELBOW: Primary | ICD-10-CM

## 2023-03-07 PROCEDURE — 97110 THERAPEUTIC EXERCISES: CPT | Performed by: OCCUPATIONAL THERAPIST

## 2023-03-07 PROCEDURE — 97112 NEUROMUSCULAR REEDUCATION: CPT | Performed by: OCCUPATIONAL THERAPIST

## 2023-03-07 NOTE — PROGRESS NOTES
Occupational Therapy Daily Treatment Note      Patient: Mary Jo Mendoza   : 1965  Referring practitioner: Edgar Mulligan MD  Date of Initial Visit: Type: THERAPY  Noted: 2023  Today's Date: 3/7/2023  Patient seen for 12 sessions    ICD-10-CM ICD-9-CM   1. Lateral epicondylitis of right elbow  M77.11 726.32   2. Right elbow pain  M25.521 719.42          Mary Jo Mendoza reports I am feeling better, but I feel like it is a stand still       Objective   See Exercise, Manual, and Modality Logs for complete treatment.   Fibrotic tissue along forearm and around LE of R UE.  Pt point tender at LE.     Assessment/Plan  Pt continues to have point tenderness at LE       Cont per POC           Timed:  Manual Therapy:    10     mins  59044;  Therapeutic Exercise:    10     mins  37975;  Therapeutic Activity:    0     mins  05672;     Neuromuscular Red:    10    mins  00509;    Ultrasound:     0     mins  11118;    Electrical Stimulation:    0     mins  47710;    Untimed:  Electrical Stimulation:    0     mins  75930 ( );  Fluidotherapy:        0    mins  87835  Paraffin:                          0    mins  48353    Timed Treatment:   30   mins   Total Treatment:     30   mins    OT SIGNATURE: MELANIE Carpenter, OTR/L, CHT     Electronically signed    [place] LICENSE: [id number]

## 2023-03-09 ENCOUNTER — TREATMENT (OUTPATIENT)
Dept: PHYSICAL THERAPY | Facility: CLINIC | Age: 58
End: 2023-03-09
Payer: COMMERCIAL

## 2023-03-09 DIAGNOSIS — M25.521 RIGHT ELBOW PAIN: ICD-10-CM

## 2023-03-09 DIAGNOSIS — M77.11 LATERAL EPICONDYLITIS OF RIGHT ELBOW: Primary | ICD-10-CM

## 2023-03-09 PROCEDURE — 97110 THERAPEUTIC EXERCISES: CPT | Performed by: OCCUPATIONAL THERAPIST

## 2023-03-09 PROCEDURE — 97112 NEUROMUSCULAR REEDUCATION: CPT | Performed by: OCCUPATIONAL THERAPIST

## 2023-03-09 NOTE — PROGRESS NOTES
Occupational Therapy Daily Treatment Note      Patient: Mary Jo Mendoza   : 1965  Referring practitioner: Edgar Mulligan MD  Date of Initial Visit: Type: THERAPY  Noted: 2023  Today's Date: 3/9/2023  Patient seen for 13 sessions    ICD-10-CM ICD-9-CM   1. Lateral epicondylitis of right elbow  M77.11 726.32   2. Right elbow pain  M25.521 719.42          Mary Jo Mendoza reports I am feeling a little better today 2/10.  I think the Astym helped this time.       Objective   See Exercise, Manual, and Modality Logs for complete treatment.   Pt is improving tolerance to L LE palpation.  Strengthening tolerated better this date    Assessment/Plan  Continue with postural strengthening and self massage to increase mobility.    Cont per POC           Timed:  Manual Therapy:    10     mins  09196;  Therapeutic Exercise:    10     mins  93494;  Therapeutic Activity:    0     mins  50383;     Neuromuscular Red:    10    mins  81890;    Ultrasound:     0     mins  94895;    Electrical Stimulation:    0     mins  47677;    Untimed:  Electrical Stimulation:    0     mins  31435 ( );  Fluidotherapy:        0    mins  03323  Paraffin:                          0    mins  27389    Timed Treatment:   30   mins   Total Treatment:     30   mins    OT SIGNATURE: MELANIE Carpenter, OTR/L, CHT     Electronically signed    [place] LICENSE: [id number]

## 2023-03-15 ENCOUNTER — TREATMENT (OUTPATIENT)
Dept: PHYSICAL THERAPY | Facility: CLINIC | Age: 58
End: 2023-03-15
Payer: COMMERCIAL

## 2023-03-15 DIAGNOSIS — M25.521 RIGHT ELBOW PAIN: ICD-10-CM

## 2023-03-15 DIAGNOSIS — M77.11 LATERAL EPICONDYLITIS OF RIGHT ELBOW: Primary | ICD-10-CM

## 2023-03-15 PROCEDURE — 97110 THERAPEUTIC EXERCISES: CPT | Performed by: OCCUPATIONAL THERAPIST

## 2023-03-15 PROCEDURE — 97530 THERAPEUTIC ACTIVITIES: CPT | Performed by: OCCUPATIONAL THERAPIST

## 2023-03-15 NOTE — PROGRESS NOTES
Occupational Therapy Daily Treatment Note      Patient: Mary Jo Mendoza   : 1965  Referring practitioner: Edgar Mulligan MD  Date of Initial Visit: Type: THERAPY  Noted: 2023  Today's Date: 3/15/2023  Patient seen for 14 sessions    ICD-10-CM ICD-9-CM   1. Lateral epicondylitis of right elbow  M77.11 726.32   2. Right elbow pain  M25.521 719.42          Mary Jo Mendoza reports I am feeling pretty good about 1/10 pain.  I think the Astym is helping.     Objective   See Exercise, Manual, and Modality Logs for complete treatment.   Pt tolerating gripping and lifting with less pain.     Assessment/Plan  Progress with strengthening and continue soft tissue work to reduce pain and increased functional strength.      Cont per POC           Timed:  Manual Therapy:    10     mins  06221;  Therapeutic Exercise:    10     mins  33662;  Therapeutic Activity:    10     mins  67080;     Neuromuscular Red:    0    mins  09088;    Ultrasound:     0     mins  32192;    Electrical Stimulation:    0     mins  02650;    Untimed:  Electrical Stimulation:    0     mins  46755 ( );  Fluidotherapy:        0    mins  73842  Paraffin:                          0    mins  79544    Timed Treatment:   30   mins   Total Treatment:     30   mins    OT SIGNATURE: MELANIE Carpenter, OTR/L, CHT     Electronically signed    [place] LICENSE: [id number]

## 2023-03-22 ENCOUNTER — TREATMENT (OUTPATIENT)
Dept: PHYSICAL THERAPY | Facility: CLINIC | Age: 58
End: 2023-03-22
Payer: COMMERCIAL

## 2023-03-22 DIAGNOSIS — M25.521 RIGHT ELBOW PAIN: ICD-10-CM

## 2023-03-22 DIAGNOSIS — M77.11 LATERAL EPICONDYLITIS OF RIGHT ELBOW: Primary | ICD-10-CM

## 2023-03-22 PROCEDURE — 97110 THERAPEUTIC EXERCISES: CPT | Performed by: OCCUPATIONAL THERAPIST

## 2023-03-22 PROCEDURE — 97530 THERAPEUTIC ACTIVITIES: CPT | Performed by: OCCUPATIONAL THERAPIST

## 2023-03-22 NOTE — PROGRESS NOTES
Re-Assessment / Re-Certification  Occupational Therapy  [address] [place] [id number]      Patient: Mary Jo Mendoza   : 1965  Diagnosis/ICD-10 Code:  Lateral epicondylitis of right elbow [M77.11]  Referring practitioner: Edgar Mulligan MD  Date of Initial Visit: Type: THERAPY  Noted: 2023  Today's Date: 3/22/2023  Patient seen for 15 sessions      Subjective:   Mary Jo Mendoza reports: My R feels good 0/10  Subjective Questionnaire: QuickDASH: 14  Clinical Progress: improved  Home Program Compliance: Yes  Treatment has included: therapeutic exercise, neuromuscular re-education, manual therapy and therapeutic activity    Subjective   Objective   Objective   Elbow flexed  Wrist ext  65          Wrist flex  65  Elbow extended  Wrist ext  65          Wrist flex 50      Strength/Myotome Testing     Additional Strength Details  Elbow flexed   L #70   R #40  Elbow Ext       L #60   R #45         Assessment/Plan    Visit Diagnoses:    ICD-10-CM ICD-9-CM   1. Lateral epicondylitis of right elbow  M77.11 726.32   2. Right elbow pain  M25.521 719.42       Progress toward previous goals: Partially Met       Plan Goals: 1. The patient complains of pain in the R elbow                  LTG 1: 12 weeks:  The patient will report a pain rating of 0/10 or better in order to improve sleep quality and tolerance to performance of activities of daily living.                                  STATUS:   MET                  STG 1a: 4weeks:  The patient will report a pain rating of 5/10 or better.                                   STATUS:  MET  2. The patient has limited ROM of the R elbow                   LTG 2: 12 weeks:  The patient will demonstrate 60 degrees of R wrist ext and flex with elbow extended to allow the patient to manipulate parts.                                  STATUS:  MET                   STG 2a: 4 weeks:  The patient will demonstrate 50 degrees of R wrist ext and  flex.                                  STATUS: MET                            3. The patient has limited strength of the R UE.                  LTG 3: 12 weeks:  The patient will demonstrate 60# in order to return to lifting at work.                                  STATUS:  NOT MET                  STG 3a: 4 weeks:  The patient will demonstrate tolerance to light strengthening without adverse reaction.                                  STATUS:  MET  4. Carrying, Moving, and Handling Objects Functional Limitation                                   LTG 4: 12 weeks:  The patient will demonstrate 0% limitation by achieving a score of 11 on the Quick DASH.                                  STATUS:  NOT MET                  STG 4a: 4 weeks:  The patient will demonstrate 1-19% limitation by achieving a score of 15 on the Quick DASH.                                    STATUS:   MET                      Recommendations: Discharge  Timeframe: 3/22/23  Prognosis to achieve goals: good      OT SIGNATURE: MELANIE Carpenter, OTR/L, CHT     Electronically signed    [place] LICENSE: [id number]   DATE TREATMENT INITIATED: 3/22/2023      Timed:  Manual Therapy:    10     mins  62412;  Therapeutic Exercise:    10     mins  95343;  Therapeutic Activity:    10     mins  77346;     Neuromuscular Red:    0    mins  93161;    Ultrasound:     0     mins  13302;    Electrical Stimulation:    0     mins  77868;    Untimed:  Electrical Stimulation:    0     mins  11425 ( );  Fluidotherapy:        0    mins  87189  Paraffin:                          0    mins  18250    Timed Treatment:   30   mins   Total Treatment:     30   mins